# Patient Record
(demographics unavailable — no encounter records)

---

## 2024-10-14 NOTE — COUNSELING
[Vitamins/Supplements] : vitamins/supplements [Contraception/ Emergency Contraception/ Safe Sexual Practices] : contraception, emergency contraception, safe sexual practices Other